# Patient Record
Sex: FEMALE | Race: WHITE | NOT HISPANIC OR LATINO | URBAN - NONMETROPOLITAN AREA
[De-identification: names, ages, dates, MRNs, and addresses within clinical notes are randomized per-mention and may not be internally consistent; named-entity substitution may affect disease eponyms.]

---

## 2021-05-07 ENCOUNTER — LAB OUTSIDE AN ENCOUNTER (OUTPATIENT)
Dept: URBAN - NONMETROPOLITAN AREA CLINIC 13 | Facility: CLINIC | Age: 66
End: 2021-05-07

## 2021-05-07 ENCOUNTER — OFFICE VISIT (OUTPATIENT)
Dept: URBAN - NONMETROPOLITAN AREA CLINIC 13 | Facility: CLINIC | Age: 66
End: 2021-05-07
Payer: MEDICARE

## 2021-05-07 VITALS
BODY MASS INDEX: 39.82 KG/M2 | HEART RATE: 109 BPM | WEIGHT: 239 LBS | SYSTOLIC BLOOD PRESSURE: 172 MMHG | TEMPERATURE: 97 F | DIASTOLIC BLOOD PRESSURE: 71 MMHG | HEIGHT: 65 IN

## 2021-05-07 DIAGNOSIS — K90.0 CELIAC DISEASE: ICD-10-CM

## 2021-05-07 DIAGNOSIS — Z86.010 PERSONAL HISTORY OF COLONIC POLYPS: ICD-10-CM

## 2021-05-07 PROCEDURE — 99244 OFF/OP CNSLTJ NEW/EST MOD 40: CPT | Performed by: INTERNAL MEDICINE

## 2021-05-07 NOTE — HPI-TODAY'S VISIT:
5/7/2021 The patient is a 66-year-old female who is referred by Dr. Priti Browne for evaluation and treatment of celiac disease.  A copy of this consultation will be sent to the referring physician.  The patient is a 66-year-old female with a history of diabetes.  She was diagnosed with celiac on routine blood work by her endocrinologist.  She was seeing a gastroenterologist in [place], but her insurance will not cover this anymore, so she has switched to us.  Her most recent EGD and colonoscopy were done in December 2020.  We do not have these pathology results today.  Her original celiac labs showed a very high TTG IgA titer in the 140 range.  She is unsure if she has had her labs rechecked since that time.  She has not been very compliant with her gluten-free diet.  She does continue to eat bread.  She states that she really does not have any symptoms from her celiac disease, and she is restricted in her diet because of her diabetes.  We have had a long discussion today about the risks of not being gluten-free if she does have celiac disease, and she does want to work on this.  At some point, we will likely refer her to our dietitian.  She also has had a recent colonoscopy.  It sounds as if she had a large polyp, and she had a repeat colonoscopy to make sure that the polyp was removed.  Again, we do not have these results today.  From a GI standpoint, today, she is actually feeling quite well.  She is to proceed with a repeat EGD.  She was told by her other gastroenterologist she needed another endoscopy.  She is also willing to have her labs rechecked today.  We have had a long discussion about celiac disease and its long-term complications.

## 2021-05-09 LAB
A/G RATIO: 2
ALBUMIN: 4.5
ALKALINE PHOSPHATASE: 147
ALT (SGPT): 29
AST (SGOT): 29
BASO (ABSOLUTE): 0.1
BASOS: 1
BILIRUBIN, TOTAL: 0.5
BUN/CREATININE RATIO: 21
BUN: 21
CALCIUM: 9.9
CARBON DIOXIDE, TOTAL: 26
CHLORIDE: 99
CREATININE: 0.98
EGFR IF AFRICN AM: 71
EGFR IF NONAFRICN AM: 61
ENDOMYSIAL ANTIBODY IGA: POSITIVE
EOS (ABSOLUTE): 0.3
EOS: 4
GLOBULIN, TOTAL: 2.3
GLUCOSE: 155
HEMATOCRIT: 42.1
HEMATOLOGY COMMENTS:: (no result)
HEMOGLOBIN: 13.6
IMMATURE CELLS: (no result)
IMMATURE GRANS (ABS): 0
IMMATURE GRANULOCYTES: 0
IMMUNOGLOBULIN A, QN, SERUM: 168
LYMPHS (ABSOLUTE): 2.1
LYMPHS: 23
MCH: 28.9
MCHC: 32.3
MCV: 89
MONOCYTES(ABSOLUTE): 0.6
MONOCYTES: 7
NEUTROPHILS (ABSOLUTE): 6
NEUTROPHILS: 65
NRBC: (no result)
PLATELETS: 246
POTASSIUM: 3.8
PROTEIN, TOTAL: 6.8
RBC: 4.71
RDW: 13.1
SODIUM: 141
T-TRANSGLUTAMINASE (TTG) IGA: 41
WBC: 9.1

## 2021-06-21 ENCOUNTER — OFFICE VISIT (OUTPATIENT)
Dept: URBAN - NONMETROPOLITAN AREA SURGERY CENTER 1 | Facility: SURGERY CENTER | Age: 66
End: 2021-06-21
Payer: MEDICARE

## 2021-06-21 DIAGNOSIS — R10.13 ABDOMINAL DISCOMFORT, EPIGASTRIC: ICD-10-CM

## 2021-06-21 DIAGNOSIS — K29.60 ADENOPAPILLOMATOSIS GASTRICA: ICD-10-CM

## 2021-06-21 DIAGNOSIS — K22.8 COLUMNAR-LINED ESOPHAGUS: ICD-10-CM

## 2021-06-21 PROCEDURE — G8907 PT DOC NO EVENTS ON DISCHARG: HCPCS | Performed by: INTERNAL MEDICINE

## 2021-06-21 PROCEDURE — 43239 EGD BIOPSY SINGLE/MULTIPLE: CPT | Performed by: INTERNAL MEDICINE

## 2021-08-11 ENCOUNTER — WEB ENCOUNTER (OUTPATIENT)
Dept: URBAN - NONMETROPOLITAN AREA CLINIC 13 | Facility: CLINIC | Age: 66
End: 2021-08-11

## 2021-08-28 ENCOUNTER — TELEPHONE ENCOUNTER (OUTPATIENT)
Dept: URBAN - METROPOLITAN AREA CLINIC 13 | Facility: CLINIC | Age: 66
End: 2021-08-28

## 2021-08-29 ENCOUNTER — TELEPHONE ENCOUNTER (OUTPATIENT)
Dept: URBAN - METROPOLITAN AREA CLINIC 13 | Facility: CLINIC | Age: 66
End: 2021-08-29

## 2021-10-04 ENCOUNTER — TELEPHONE ENCOUNTER (OUTPATIENT)
Dept: URBAN - NONMETROPOLITAN AREA CLINIC 13 | Facility: CLINIC | Age: 66
End: 2021-10-04

## 2021-10-04 RX ORDER — OMEPRAZOLE 20 MG/1
1 CAPSULE 30 MINUTES BEFORE MORNING MEAL CAPSULE, DELAYED RELEASE ORAL ONCE A DAY
Qty: 90 CAPSULES | Refills: 3

## 2021-11-12 ENCOUNTER — WEB ENCOUNTER (OUTPATIENT)
Dept: URBAN - NONMETROPOLITAN AREA CLINIC 13 | Facility: CLINIC | Age: 66
End: 2021-11-12

## 2021-11-12 ENCOUNTER — OFFICE VISIT (OUTPATIENT)
Dept: URBAN - NONMETROPOLITAN AREA CLINIC 13 | Facility: CLINIC | Age: 66
End: 2021-11-12
Payer: MEDICARE

## 2021-11-12 VITALS
HEART RATE: 98 BPM | DIASTOLIC BLOOD PRESSURE: 74 MMHG | BODY MASS INDEX: 38.65 KG/M2 | HEIGHT: 65 IN | SYSTOLIC BLOOD PRESSURE: 154 MMHG | WEIGHT: 232 LBS

## 2021-11-12 DIAGNOSIS — K21.00 GASTROESOPHAGEAL REFLUX DISEASE WITH ESOPHAGITIS WITHOUT HEMORRHAGE: ICD-10-CM

## 2021-11-12 DIAGNOSIS — K90.0 CELIAC DISEASE: ICD-10-CM

## 2021-11-12 DIAGNOSIS — Z86.010 PERSONAL HISTORY OF COLONIC POLYPS: ICD-10-CM

## 2021-11-12 PROCEDURE — 99214 OFFICE O/P EST MOD 30 MIN: CPT | Performed by: INTERNAL MEDICINE

## 2021-11-12 RX ORDER — OMEPRAZOLE 20 MG/1
1 CAPSULE 30 MINUTES BEFORE MORNING MEAL CAPSULE, DELAYED RELEASE ORAL ONCE A DAY
Qty: 90 CAPSULES | Refills: 3 | Status: ACTIVE | COMMUNITY

## 2021-11-12 NOTE — HPI-TODAY'S VISIT:
5/7/2021 The patient is a 66-year-old female who is referred by Dr. Priti Browne for evaluation and treatment of celiac disease.  A copy of this consultation will be sent to the referring physician.  The patient is a 66-year-old female with a history of diabetes.  She was diagnosed with celiac on routine blood work by her endocrinologist.  She was seeing a gastroenterologist in [place], but her insurance will not cover this anymore, so she has switched to us.  Her most recent EGD and colonoscopy were done in December 2020.  We do not have these pathology results today.  Her original celiac labs showed a very high TTG IgA titer in the 140 range.  She is unsure if she has had her labs rechecked since that time.  She has not been very compliant with her gluten-free diet.  She does continue to eat bread.  She states that she really does not have any symptoms from her celiac disease, and she is restricted in her diet because of her diabetes.  We have had a long discussion today about the risks of not being gluten-free if she does have celiac disease, and she does want to work on this.  At some point, we will likely refer her to our dietitian.  She also has had a recent colonoscopy.  It sounds as if she had a large polyp, and she had a repeat colonoscopy to make sure that the polyp was removed.  Again, we do not have these results today.  From a GI standpoint, today, she is actually feeling quite well.  She is to proceed with a repeat EGD.  She was told by her other gastroenterologist she needed another endoscopy.  She is also willing to have her labs rechecked today.  We have had a long discussion about celiac disease and its long-term complications. 11/12/2021 The patient presents today for follow-up of her celiac disease and history of colon polyps. Since her last visit, she continues to try to cut back on gluten. She admits that she is not completely gluten-free. Her TTG on her last visit was 41. Interestingly, her biopsies on her EGD did not show evidence of intraepithelial lymphocytes. We have reviewed her colonoscopy done by her previous colon gastroenterologist. She is due for repeat colonoscopy in 2023. Overall, she is feeling well today.

## 2021-11-15 LAB
A/G RATIO: 1.6
ALBUMIN: 4.1
ALKALINE PHOSPHATASE: 168
ALT (SGPT): 25
AST (SGOT): 26
BASO (ABSOLUTE): 0.1
BASOS: 1
BILIRUBIN, TOTAL: 0.3
BUN/CREATININE RATIO: 19
BUN: 17
CALCIUM: 9.3
CARBON DIOXIDE, TOTAL: 22
CHLORIDE: 101
CREATININE: 0.88
DEAMIDATED GLIADIN ABS, IGA: 68
DEAMIDATED GLIADIN ABS, IGG: 59
EGFR IF AFRICN AM: 80
EGFR IF NONAFRICN AM: 70
ENDOMYSIAL ANTIBODY IGA: POSITIVE
EOS (ABSOLUTE): 0.2
EOS: 3
GLOBULIN, TOTAL: 2.5
GLUCOSE: 103
HEMATOCRIT: 40.7
HEMATOLOGY COMMENTS:: (no result)
HEMOGLOBIN: 13.2
IMMATURE CELLS: (no result)
IMMATURE GRANS (ABS): 0
IMMATURE GRANULOCYTES: 0
IMMUNOGLOBULIN A, QN, SERUM: 177
LYMPHS (ABSOLUTE): 1.9
LYMPHS: 28
Lab: (no result)
MCH: 28.1
MCHC: 32.4
MCV: 87
MONOCYTES(ABSOLUTE): 0.4
MONOCYTES: 7
NEUTROPHILS (ABSOLUTE): 4.2
NEUTROPHILS: 61
NRBC: (no result)
PLATELETS: 247
POTASSIUM: 4.3
PROTEIN, TOTAL: 6.6
RBC: 4.69
RDW: 13
SODIUM: 140
T-TRANSGLUTAMINASE (TTG) IGA: 20
T-TRANSGLUTAMINASE (TTG) IGG: 33
WBC: 6.8

## 2022-05-13 ENCOUNTER — OFFICE VISIT (OUTPATIENT)
Dept: URBAN - NONMETROPOLITAN AREA CLINIC 2 | Facility: CLINIC | Age: 67
End: 2022-05-13
Payer: MEDICARE

## 2022-05-13 ENCOUNTER — OFFICE VISIT (OUTPATIENT)
Dept: URBAN - NONMETROPOLITAN AREA CLINIC 2 | Facility: CLINIC | Age: 67
End: 2022-05-13

## 2022-05-13 VITALS
DIASTOLIC BLOOD PRESSURE: 76 MMHG | SYSTOLIC BLOOD PRESSURE: 132 MMHG | BODY MASS INDEX: 37.45 KG/M2 | HEIGHT: 65 IN | TEMPERATURE: 97.4 F | HEART RATE: 80 BPM | WEIGHT: 224.8 LBS

## 2022-05-13 DIAGNOSIS — Z86.010 PERSONAL HISTORY OF COLONIC POLYPS: ICD-10-CM

## 2022-05-13 DIAGNOSIS — K90.0 CELIAC DISEASE: ICD-10-CM

## 2022-05-13 DIAGNOSIS — K21.00 GASTROESOPHAGEAL REFLUX DISEASE WITH ESOPHAGITIS WITHOUT HEMORRHAGE: ICD-10-CM

## 2022-05-13 PROCEDURE — 99214 OFFICE O/P EST MOD 30 MIN: CPT | Performed by: NURSE PRACTITIONER

## 2022-05-13 RX ORDER — OMEPRAZOLE 20 MG/1
1 CAPSULE 30 MINUTES BEFORE MORNING MEAL CAPSULE, DELAYED RELEASE ORAL ONCE A DAY
Qty: 90 CAPSULES | Refills: 3 | Status: ACTIVE | COMMUNITY

## 2022-05-13 NOTE — HPI-TODAY'S VISIT:
5/7/2021 The patient is a 66-year-old female who is referred by Dr. Priti Browne for evaluation and treatment of celiac disease.  A copy of this consultation will be sent to the referring physician.  The patient is a 66-year-old female with a history of diabetes.  She was diagnosed with celiac on routine blood work by her endocrinologist.  She was seeing a gastroenterologist in [place], but her insurance will not cover this anymore, so she has switched to us.  Her most recent EGD and colonoscopy were done in December 2020.  We do not have these pathology results today.  Her original celiac labs showed a very high TTG IgA titer in the 140 range.  She is unsure if she has had her labs rechecked since that time.  She has not been very compliant with her gluten-free diet.  She does continue to eat bread.  She states that she really does not have any symptoms from her celiac disease, and she is restricted in her diet because of her diabetes.  We have had a long discussion today about the risks of not being gluten-free if she does have celiac disease, and she does want to work on this.  At some point, we will likely refer her to our dietitian.  She also has had a recent colonoscopy.  It sounds as if she had a large polyp, and she had a repeat colonoscopy to make sure that the polyp was removed.  Again, we do not have these results today.  From a GI standpoint, today, she is actually feeling quite well.  She is to proceed with a repeat EGD.  She was told by her other gastroenterologist she needed another endoscopy.  She is also willing to have her labs rechecked today.  We have had a long discussion about celiac disease and its long-term complications. 11/12/2021 The patient presents today for follow-up of her celiac disease and history of colon polyps. Since her last visit, she continues to try to cut back on gluten. She admits that she is not completely gluten-free. Her TTG on her last visit was 41. Interestingly, her biopsies on her EGD did not show evidence of intraepithelial lymphocytes. We have reviewed her colonoscopy done by her previous colon gastroenterologist. She is due for repeat colonoscopy in 2023. Overall, she is feeling well today. 5/13/2022 Gely presents for follow-up of reflux and celiac disease.  She is on omeprazole 20 mg daily with no breakthrough.  She continues to take NSAIDs as needed.  Her celiac titers were improved at her last visit, down from 40-20 on her TTG IgA.  She tries to eat gluten-free but does cheat often.  She is due for repeat labs.  Her bowels are moving regularly.  She will be due for screening colonoscopy next year.  MB

## 2022-05-15 LAB
DEAMIDATED GLIADIN ABS, IGA: 94
DEAMIDATED GLIADIN ABS, IGG: 56
ENDOMYSIAL ANTIBODY IGA: POSITIVE
IMMUNOGLOBULIN A, QN, SERUM: 181
T-TRANSGLUTAMINASE (TTG) IGA: 47
T-TRANSGLUTAMINASE (TTG) IGG: 32

## 2022-05-16 ENCOUNTER — TELEPHONE ENCOUNTER (OUTPATIENT)
Dept: URBAN - METROPOLITAN AREA CLINIC 92 | Facility: CLINIC | Age: 67
End: 2022-05-16

## 2022-05-26 ENCOUNTER — WEB ENCOUNTER (OUTPATIENT)
Dept: URBAN - METROPOLITAN AREA TELEHEALTH 2 | Facility: TELEHEALTH | Age: 67
End: 2022-05-26

## 2022-06-01 ENCOUNTER — OFFICE VISIT (OUTPATIENT)
Dept: URBAN - METROPOLITAN AREA TELEHEALTH 2 | Facility: TELEHEALTH | Age: 67
End: 2022-06-01
Payer: MEDICARE

## 2022-06-01 DIAGNOSIS — K90.0 CELIAC DISEASE: ICD-10-CM

## 2022-06-01 PROCEDURE — 97802 MEDICAL NUTRITION INDIV IN: CPT | Performed by: DIETITIAN, REGISTERED

## 2022-06-01 RX ORDER — OMEPRAZOLE 20 MG/1
1 CAPSULE 30 MINUTES BEFORE MORNING MEAL CAPSULE, DELAYED RELEASE ORAL ONCE A DAY
Qty: 90 CAPSULES | Refills: 3 | Status: ACTIVE | COMMUNITY

## 2022-11-03 ENCOUNTER — TELEPHONE ENCOUNTER (OUTPATIENT)
Dept: URBAN - NONMETROPOLITAN AREA CLINIC 13 | Facility: CLINIC | Age: 67
End: 2022-11-03

## 2022-11-07 LAB
A/G RATIO: 2
ALBUMIN: 4.5
ALKALINE PHOSPHATASE: 133
ALT (SGPT): 24
AST (SGOT): 23
BASO (ABSOLUTE): 0.1
BASOS: 1
BILIRUBIN, TOTAL: 0.3
BUN/CREATININE RATIO: 22
BUN: 21
CALCIUM: 9.9
CARBON DIOXIDE, TOTAL: 25
CHLORIDE: 102
CREATININE: 0.97
DEAMIDATED GLIADIN ABS, IGA: 34
DEAMIDATED GLIADIN ABS, IGG: 33
EGFR: 65
ENDOMYSIAL ANTIBODY IGA: NEGATIVE
EOS (ABSOLUTE): 0.3
EOS: 4
GLOBULIN, TOTAL: 2.2
GLUCOSE: 128
HEMATOCRIT: 40.8
HEMATOLOGY COMMENTS:: (no result)
HEMOGLOBIN: 13.1
IMMATURE CELLS: (no result)
IMMATURE GRANS (ABS): 0
IMMATURE GRANULOCYTES: 0
IMMUNOGLOBULIN A, QN, SERUM: 164
LYMPHS (ABSOLUTE): 3.1
LYMPHS: 41
MCH: 28.6
MCHC: 32.1
MCV: 89
MONOCYTES(ABSOLUTE): 0.5
MONOCYTES: 7
NEUTROPHILS (ABSOLUTE): 3.6
NEUTROPHILS: 47
NRBC: (no result)
PLATELETS: 261
POTASSIUM: 4.3
PROTEIN, TOTAL: 6.7
RBC: 4.58
RDW: 12.7
SODIUM: 140
T-TRANSGLUTAMINASE (TTG) IGA: 8
T-TRANSGLUTAMINASE (TTG) IGG: 11
WBC: 7.6

## 2022-11-08 ENCOUNTER — TELEPHONE ENCOUNTER (OUTPATIENT)
Dept: URBAN - METROPOLITAN AREA CLINIC 92 | Facility: CLINIC | Age: 67
End: 2022-11-08

## 2022-11-08 ENCOUNTER — LAB OUTSIDE AN ENCOUNTER (OUTPATIENT)
Dept: URBAN - METROPOLITAN AREA CLINIC 92 | Facility: CLINIC | Age: 67
End: 2022-11-08

## 2022-11-09 ENCOUNTER — OFFICE VISIT (OUTPATIENT)
Dept: URBAN - NONMETROPOLITAN AREA CLINIC 13 | Facility: CLINIC | Age: 67
End: 2022-11-09
Payer: MEDICARE

## 2022-11-09 VITALS
HEART RATE: 86 BPM | HEIGHT: 65 IN | WEIGHT: 223.6 LBS | DIASTOLIC BLOOD PRESSURE: 77 MMHG | BODY MASS INDEX: 37.25 KG/M2 | SYSTOLIC BLOOD PRESSURE: 150 MMHG | TEMPERATURE: 97.7 F

## 2022-11-09 DIAGNOSIS — K21.00 GASTROESOPHAGEAL REFLUX DISEASE WITH ESOPHAGITIS WITHOUT HEMORRHAGE: ICD-10-CM

## 2022-11-09 DIAGNOSIS — K90.0 CELIAC DISEASE: ICD-10-CM

## 2022-11-09 DIAGNOSIS — Z86.010 PERSONAL HISTORY OF COLONIC POLYPS: ICD-10-CM

## 2022-11-09 DIAGNOSIS — R74.8 ELEVATED ALKALINE PHOSPHATASE LEVEL: ICD-10-CM

## 2022-11-09 PROCEDURE — 99214 OFFICE O/P EST MOD 30 MIN: CPT | Performed by: NURSE PRACTITIONER

## 2022-11-09 RX ORDER — OMEPRAZOLE 20 MG/1
1 CAPSULE 30 MINUTES BEFORE MORNING MEAL CAPSULE, DELAYED RELEASE ORAL ONCE A DAY
Qty: 90 CAPSULES | Refills: 3 | Status: ACTIVE | COMMUNITY

## 2022-11-09 NOTE — HPI-TODAY'S VISIT:
5/7/2021 The patient is a 66-year-old female who is referred by Dr. Priti Browne for evaluation and treatment of celiac disease.  A copy of this consultation will be sent to the referring physician.  The patient is a 66-year-old female with a history of diabetes.  She was diagnosed with celiac on routine blood work by her endocrinologist.  She was seeing a gastroenterologist in [place], but her insurance will not cover this anymore, so she has switched to us.  Her most recent EGD and colonoscopy were done in December 2020.  We do not have these pathology results today.  Her original celiac labs showed a very high TTG IgA titer in the 140 range.  She is unsure if she has had her labs rechecked since that time.  She has not been very compliant with her gluten-free diet.  She does continue to eat bread.  She states that she really does not have any symptoms from her celiac disease, and she is restricted in her diet because of her diabetes.  We have had a long discussion today about the risks of not being gluten-free if she does have celiac disease, and she does want to work on this.  At some point, we will likely refer her to our dietitian.  She also has had a recent colonoscopy.  It sounds as if she had a large polyp, and she had a repeat colonoscopy to make sure that the polyp was removed.  Again, we do not have these results today.  From a GI standpoint, today, she is actually feeling quite well.  She is to proceed with a repeat EGD.  She was told by her other gastroenterologist she needed another endoscopy.  She is also willing to have her labs rechecked today.  We have had a long discussion about celiac disease and its long-term complications. 11/12/2021 The patient presents today for follow-up of her celiac disease and history of colon polyps. Since her last visit, she continues to try to cut back on gluten. She admits that she is not completely gluten-free. Her TTG on her last visit was 41. Interestingly, her biopsies on her EGD did not show evidence of intraepithelial lymphocytes. We have reviewed her colonoscopy done by her previous colon gastroenterologist. She is due for repeat colonoscopy in 2023. Overall, she is feeling well today. 5/13/2022 Gely presents for follow-up of reflux and celiac disease.  She is on omeprazole 20 mg daily with no breakthrough.  She continues to take NSAIDs as needed.  Her celiac titers were improved at her last visit, down from 40-20 on her TTG IgA.  She tries to eat gluten-free but does cheat often.  She is due for repeat labs.  Her bowels are moving regularly.  She will be due for screening colonoscopy next year.  MB 11/9/2022 Gely presents for follow-up of celiac disease.  Since her last visit her TTG IgA is down to 8, she continues to strictly free diet.  Her labs showed a mildly elevated alkaline phosphatase.  Her sister has fatty liver.  Her bowels are moving regularly.  Her reflux is stable on omeprazole 20 mg daily.  Today she has no new GI complaints.  She is due for repeat colonoscopy in December 2023.  She will continue her gluten-free diet, she agrees to repeat labs in 3 months with her alkaline phosphatase along with serologies and ultrasound imaging of her liver today.  MB

## 2022-12-02 ENCOUNTER — LAB OUTSIDE AN ENCOUNTER (OUTPATIENT)
Dept: URBAN - METROPOLITAN AREA CLINIC 92 | Facility: CLINIC | Age: 67
End: 2022-12-02

## 2022-12-02 ENCOUNTER — TELEPHONE ENCOUNTER (OUTPATIENT)
Dept: URBAN - METROPOLITAN AREA CLINIC 92 | Facility: CLINIC | Age: 67
End: 2022-12-02

## 2022-12-26 ENCOUNTER — TELEPHONE ENCOUNTER (OUTPATIENT)
Dept: URBAN - METROPOLITAN AREA CLINIC 92 | Facility: CLINIC | Age: 67
End: 2022-12-26

## 2023-05-10 ENCOUNTER — OFFICE VISIT (OUTPATIENT)
Dept: URBAN - NONMETROPOLITAN AREA CLINIC 13 | Facility: CLINIC | Age: 68
End: 2023-05-10

## 2023-06-29 ENCOUNTER — CLAIMS CREATED FROM THE CLAIM WINDOW (OUTPATIENT)
Dept: URBAN - NONMETROPOLITAN AREA CLINIC 2 | Facility: CLINIC | Age: 68
End: 2023-06-29
Payer: MEDICARE

## 2023-06-29 ENCOUNTER — WEB ENCOUNTER (OUTPATIENT)
Dept: URBAN - NONMETROPOLITAN AREA CLINIC 2 | Facility: CLINIC | Age: 68
End: 2023-06-29

## 2023-06-29 ENCOUNTER — LAB OUTSIDE AN ENCOUNTER (OUTPATIENT)
Dept: URBAN - NONMETROPOLITAN AREA CLINIC 2 | Facility: CLINIC | Age: 68
End: 2023-06-29

## 2023-06-29 VITALS
BODY MASS INDEX: 36.82 KG/M2 | DIASTOLIC BLOOD PRESSURE: 69 MMHG | WEIGHT: 221 LBS | TEMPERATURE: 98.6 F | SYSTOLIC BLOOD PRESSURE: 148 MMHG | HEIGHT: 65 IN | HEART RATE: 76 BPM

## 2023-06-29 DIAGNOSIS — K76.89 LESION OF LIVER: ICD-10-CM

## 2023-06-29 DIAGNOSIS — K76.9 LIVER LESION: ICD-10-CM

## 2023-06-29 DIAGNOSIS — K21.00 GASTROESOPHAGEAL REFLUX DISEASE WITH ESOPHAGITIS WITHOUT HEMORRHAGE: ICD-10-CM

## 2023-06-29 DIAGNOSIS — Z86.010 PERSONAL HISTORY OF COLONIC POLYPS: ICD-10-CM

## 2023-06-29 DIAGNOSIS — R74.8 ELEVATED ALKALINE PHOSPHATASE LEVEL: ICD-10-CM

## 2023-06-29 DIAGNOSIS — K90.0 CELIAC DISEASE: ICD-10-CM

## 2023-06-29 PROBLEM — 274770006: Status: ACTIVE | Noted: 2022-11-08

## 2023-06-29 PROCEDURE — 99214 OFFICE O/P EST MOD 30 MIN: CPT | Performed by: NURSE PRACTITIONER

## 2023-06-29 RX ORDER — OMEPRAZOLE 20 MG/1
1 CAPSULE 30 MINUTES BEFORE MORNING MEAL CAPSULE, DELAYED RELEASE ORAL ONCE A DAY
Qty: 90 CAPSULES | Refills: 3 | Status: ACTIVE | COMMUNITY

## 2023-06-29 RX ORDER — SODIUM PICOSULFATE, MAGNESIUM OXIDE, AND ANHYDROUS CITRIC ACID 12; 3.5; 1 G/175ML; G/175ML; MG/175ML
175 ML THE FIRST DOSE THE EVENING BEFORE AND SECOND DOSE THE MORNING OF COLONOSCOPY LIQUID ORAL ONCE A DAY
Qty: 350 | OUTPATIENT
Start: 2023-06-29 | End: 2023-07-01

## 2023-06-29 NOTE — HPI-TODAY'S VISIT:
5/7/2021 The patient is a 66-year-old female who is referred by Dr. Priti Browne for evaluation and treatment of celiac disease.  A copy of this consultation will be sent to the referring physician.  The patient is a 66-year-old female with a history of diabetes.  She was diagnosed with celiac on routine blood work by her endocrinologist.  She was seeing a gastroenterologist in [place], but her insurance will not cover this anymore, so she has switched to us.  Her most recent EGD and colonoscopy were done in December 2020.  We do not have these pathology results today.  Her original celiac labs showed a very high TTG IgA titer in the 140 range.  She is unsure if she has had her labs rechecked since that time.  She has not been very compliant with her gluten-free diet.  She does continue to eat bread.  She states that she really does not have any symptoms from her celiac disease, and she is restricted in her diet because of her diabetes.  We have had a long discussion today about the risks of not being gluten-free if she does have celiac disease, and she does want to work on this.  At some point, we will likely refer her to our dietitian.  She also has had a recent colonoscopy.  It sounds as if she had a large polyp, and she had a repeat colonoscopy to make sure that the polyp was removed.  Again, we do not have these results today.  From a GI standpoint, today, she is actually feeling quite well.  She is to proceed with a repeat EGD.  She was told by her other gastroenterologist she needed another endoscopy.  She is also willing to have her labs rechecked today.  We have had a long discussion about celiac disease and its long-term complications. 11/12/2021 The patient presents today for follow-up of her celiac disease and history of colon polyps. Since her last visit, she continues to try to cut back on gluten. She admits that she is not completely gluten-free. Her TTG on her last visit was 41. Interestingly, her biopsies on her EGD did not show evidence of intraepithelial lymphocytes. We have reviewed her colonoscopy done by her previous colon gastroenterologist. She is due for repeat colonoscopy in 2023. Overall, she is feeling well today. 5/13/2022 Gely presents for follow-up of reflux and celiac disease.  She is on omeprazole 20 mg daily with no breakthrough.  She continues to take NSAIDs as needed.  Her celiac titers were improved at her last visit, down from 40-20 on her TTG IgA.  She tries to eat gluten-free but does cheat often.  She is due for repeat labs.  Her bowels are moving regularly.  She will be due for screening colonoscopy next year.  MB 11/9/2022 Gely presents for follow-up of celiac disease.  Since her last visit her TTG IgA is down to 8, she continues to strictly free diet.  Her labs showed a mildly elevated alkaline phosphatase.  Her sister has fatty liver.  Her bowels are moving regularly.  Her reflux is stable on omeprazole 20 mg daily.  Today she has no new GI complaints.  She is due for repeat colonoscopy in December 2023.  She will continue her gluten-free diet, she agrees to repeat labs in 3 months with her alkaline phosphatase along with serologies and ultrasound imaging of her liver today.  MB 6/29/2023 Gely presents for follow-up of celiac disease.  Since her last visit she has not been eating gluten-free, she had back surgery and is now living with her sister.  She got her TTG IgA down from 41-8 on her last labs last fall.  She has been taking NSAIDs for her back but has decreased these and now on gabapentin postop.  Her alkaline phosphatase was elevated but had decreased at her labs last fall.  This is likely combination of medications and untreated celiac disease.  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  Her colonoscopy is due this December and we will schedule today.  We have discussed again the importance of her gluten-free diet.  MB

## 2023-07-07 ENCOUNTER — TELEPHONE ENCOUNTER (OUTPATIENT)
Dept: URBAN - NONMETROPOLITAN AREA CLINIC 2 | Facility: CLINIC | Age: 68
End: 2023-07-07

## 2023-07-07 LAB
A/G RATIO: 1.5
ABSOLUTE BASOPHILS: 62
ABSOLUTE EOSINOPHILS: 131
ABSOLUTE LYMPHOCYTES: 2022
ABSOLUTE MONOCYTES: 359
ABSOLUTE NEUTROPHILS: 4326
ACTIN (SMOOTH MUSCLE) ANTIBODY (IGG): <20
ALBUMIN: 4.1
ALKALINE PHOSPHATASE: 138
ALKALINE PHOSPHATASE: 156
ALPHA-1-ANTITRYPSIN QN: 140
ALT (SGPT): 25
ANA SCREEN, IFA: NEGATIVE
AST (SGOT): 24
BASOPHILS: 0.9
BILIRUBIN, TOTAL: 0.4
BONE ISOENZYMES: 32
BUN/CREATININE RATIO: (no result)
BUN: 17
CALCIUM: 9.9
CARBON DIOXIDE, TOTAL: 30
CHLORIDE: 103
CREATININE: 0.99
EGFR: 63
ENDOMYSIAL ANTIBODY SCR: POSITIVE
ENDOMYSIAL ANTIBODY TITER: (no result)
EOSINOPHILS: 1.9
FERRITIN, SERUM: 24
GGT: 59
GLOBULIN, TOTAL: 2.7
GLUCOSE: 42
HEMATOCRIT: 39
HEMOGLOBIN: 12.4
IMMUNOGLOBULIN A: 195
INTERPRETATION: (no result)
INTESTINAL ISOENZYMES: 4
IRON BIND.CAP.(TIBC): 371
IRON SATURATION: 15
IRON: 55
LIVER ISOENZYMES: 64
LYMPHOCYTES: 29.3
MACROHEPATIC ISOENZYMES: 0
MCH: 28.6
MCHC: 31.8
MCV: 89.9
MITOCHONDRIAL (M2) ANTIBODY: <=20
MONOCYTES: 5.2
MPV: 11.5
NEUTROPHILS: 62.7
PLACENTAL ISOENZYMES: 0
PLATELET COUNT: 310
POTASSIUM: 3.9
PROTEIN, TOTAL: 6.8
RDW: 12.8
RED BLOOD CELL COUNT: 4.34
SODIUM: 141
TISSUE TRANSGLUTAMINASE AB, IGA: 186.9
WHITE BLOOD CELL COUNT: 6.9

## 2023-07-31 ENCOUNTER — TELEPHONE ENCOUNTER (OUTPATIENT)
Dept: URBAN - NONMETROPOLITAN AREA CLINIC 2 | Facility: CLINIC | Age: 68
End: 2023-07-31

## 2023-10-07 ENCOUNTER — LAB OUTSIDE AN ENCOUNTER (OUTPATIENT)
Dept: URBAN - NONMETROPOLITAN AREA CLINIC 2 | Facility: CLINIC | Age: 68
End: 2023-10-07

## 2023-12-05 ENCOUNTER — TELEPHONE ENCOUNTER (OUTPATIENT)
Dept: URBAN - NONMETROPOLITAN AREA CLINIC 2 | Facility: CLINIC | Age: 68
End: 2023-12-05

## 2023-12-15 ENCOUNTER — CLAIMS CREATED FROM THE CLAIM WINDOW (OUTPATIENT)
Dept: URBAN - NONMETROPOLITAN AREA SURGERY CENTER 1 | Facility: SURGERY CENTER | Age: 68
End: 2023-12-15
Payer: MEDICARE

## 2023-12-15 DIAGNOSIS — Z86.010 ADENOMAS PERSONAL HISTORY OF COLONIC POLYPS: ICD-10-CM

## 2023-12-15 DIAGNOSIS — Z09 CARDIOLOGY FOLLOW-UP ENCOUNTER: ICD-10-CM

## 2023-12-15 DIAGNOSIS — Z86.010 PERSONAL HISTORY OF COLON POLYPS: ICD-10-CM

## 2023-12-15 DIAGNOSIS — Z09 ENCOUNTER FOR COLONOSCOPY FOLLOWING COLON POLYP REMOVAL: ICD-10-CM

## 2023-12-15 PROCEDURE — G0105 COLORECTAL SCRN; HI RISK IND: HCPCS | Performed by: CLINIC/CENTER

## 2023-12-15 PROCEDURE — G8907 PT DOC NO EVENTS ON DISCHARG: HCPCS | Performed by: CLINIC/CENTER

## 2023-12-15 PROCEDURE — G0105 COLORECTAL SCRN; HI RISK IND: HCPCS | Performed by: INTERNAL MEDICINE

## 2023-12-15 PROCEDURE — 00812 ANES LWR INTST SCR COLSC: CPT | Performed by: NURSE ANESTHETIST, CERTIFIED REGISTERED

## 2024-01-02 ENCOUNTER — TELEPHONE ENCOUNTER (OUTPATIENT)
Dept: URBAN - NONMETROPOLITAN AREA CLINIC 2 | Facility: CLINIC | Age: 69
End: 2024-01-02

## 2024-01-09 LAB
A/G RATIO: 1.7
ABSOLUTE BASOPHILS: 41
ABSOLUTE EOSINOPHILS: 311
ABSOLUTE LYMPHOCYTES: 2105
ABSOLUTE MONOCYTES: 373
ABSOLUTE NEUTROPHILS: 4071
ALBUMIN: 4.1
ALKALINE PHOSPHATASE: 180
ALT (SGPT): 22
AST (SGOT): 17
BASOPHILS: 0.6
BILIRUBIN, TOTAL: 0.4
BUN/CREATININE RATIO: (no result)
BUN: 23
CALCIUM: 9.5
CARBON DIOXIDE, TOTAL: 32
CHLORIDE: 97
CREATININE: 0.93
EGFR: 68
ENDOMYSIAL ANTIBODY SCR: NEGATIVE
EOSINOPHILS: 4.5
GGT: 70
GLOBULIN, TOTAL: 2.4
GLUCOSE: 245
HEMATOCRIT: 39.4
HEMOGLOBIN: 12.8
IMMUNOGLOBULIN A: 245
INTERPRETATION: (no result)
LYMPHOCYTES: 30.5
MCH: 28.1
MCHC: 32.5
MCV: 86.4
MONOCYTES: 5.4
MPV: 11.5
NEUTROPHILS: 59
PLATELET COUNT: 325
POTASSIUM: 4
PROTEIN, TOTAL: 6.5
RDW: 12.9
RED BLOOD CELL COUNT: 4.56
SODIUM: 139
TISSUE TRANSGLUTAMINASE AB, IGA: 77.6
WHITE BLOOD CELL COUNT: 6.9

## 2024-01-10 ENCOUNTER — LAB OUTSIDE AN ENCOUNTER (OUTPATIENT)
Dept: URBAN - NONMETROPOLITAN AREA CLINIC 13 | Facility: CLINIC | Age: 69
End: 2024-01-10

## 2024-01-10 ENCOUNTER — DASHBOARD ENCOUNTERS (OUTPATIENT)
Age: 69
End: 2024-01-10

## 2024-01-10 ENCOUNTER — OFFICE VISIT (OUTPATIENT)
Dept: URBAN - NONMETROPOLITAN AREA CLINIC 13 | Facility: CLINIC | Age: 69
End: 2024-01-10
Payer: MEDICARE

## 2024-01-10 VITALS
SYSTOLIC BLOOD PRESSURE: 147 MMHG | WEIGHT: 222.4 LBS | TEMPERATURE: 98.6 F | DIASTOLIC BLOOD PRESSURE: 74 MMHG | BODY MASS INDEX: 37.05 KG/M2 | HEIGHT: 65 IN | HEART RATE: 61 BPM

## 2024-01-10 DIAGNOSIS — R74.8 ELEVATED ALKALINE PHOSPHATASE LEVEL: ICD-10-CM

## 2024-01-10 DIAGNOSIS — K90.0 CELIAC DISEASE: ICD-10-CM

## 2024-01-10 DIAGNOSIS — Z86.010 PERSONAL HISTORY OF COLONIC POLYPS: ICD-10-CM

## 2024-01-10 DIAGNOSIS — K21.00 GASTROESOPHAGEAL REFLUX DISEASE WITH ESOPHAGITIS WITHOUT HEMORRHAGE: ICD-10-CM

## 2024-01-10 PROBLEM — 300331000: Status: ACTIVE | Noted: 2022-12-02

## 2024-01-10 PROBLEM — 396331005: Status: ACTIVE | Noted: 2021-05-07

## 2024-01-10 PROBLEM — 428283002: Status: ACTIVE | Noted: 2021-05-07

## 2024-01-10 PROBLEM — 266433003: Status: ACTIVE | Noted: 2021-10-10

## 2024-01-10 PROCEDURE — 99214 OFFICE O/P EST MOD 30 MIN: CPT | Performed by: NURSE PRACTITIONER

## 2024-01-10 RX ORDER — POLYETHYLENE GLYCOL 3350, SODIUM SULFATE ANHYDROUS, SODIUM BICARBONATE, SODIUM CHLORIDE, POTASSIUM CHLORIDE 236; 22.74; 6.74; 5.86; 2.97 G/4L; G/4L; G/4L; G/4L; G/4L
3000 MILLILITERS POWDER, FOR SOLUTION ORAL ONCE
Qty: 3000 MILLILITERS | Refills: 0 | OUTPATIENT
Start: 2024-01-10 | End: 2024-01-11

## 2024-01-10 RX ORDER — OMEPRAZOLE 20 MG/1
1 CAPSULE 30 MINUTES BEFORE MORNING MEAL CAPSULE, DELAYED RELEASE ORAL ONCE A DAY
Qty: 90 CAPSULES | Refills: 3 | Status: ACTIVE | COMMUNITY

## 2024-01-10 NOTE — HPI-TODAY'S VISIT:
5/7/2021 The patient is a 66-year-old female who is referred by Dr. Priti Browne for evaluation and treatment of celiac disease.  A copy of this consultation will be sent to the referring physician.  The patient is a 66-year-old female with a history of diabetes.  She was diagnosed with celiac on routine blood work by her endocrinologist.  She was seeing a gastroenterologist in [place], but her insurance will not cover this anymore, so she has switched to us.  Her most recent EGD and colonoscopy were done in December 2020.  We do not have these pathology results today.  Her original celiac labs showed a very high TTG IgA titer in the 140 range.  She is unsure if she has had her labs rechecked since that time.  She has not been very compliant with her gluten-free diet.  She does continue to eat bread.  She states that she really does not have any symptoms from her celiac disease, and she is restricted in her diet because of her diabetes.  We have had a long discussion today about the risks of not being gluten-free if she does have celiac disease, and she does want to work on this.  At some point, we will likely refer her to our dietitian.  She also has had a recent colonoscopy.  It sounds as if she had a large polyp, and she had a repeat colonoscopy to make sure that the polyp was removed.  Again, we do not have these results today.  From a GI standpoint, today, she is actually feeling quite well.  She is to proceed with a repeat EGD.  She was told by her other gastroenterologist she needed another endoscopy.  She is also willing to have her labs rechecked today.  We have had a long discussion about celiac disease and its long-term complications. 11/12/2021 The patient presents today for follow-up of her celiac disease and history of colon polyps. Since her last visit, she continues to try to cut back on gluten. She admits that she is not completely gluten-free. Her TTG on her last visit was 41. Interestingly, her biopsies on her EGD did not show evidence of intraepithelial lymphocytes. We have reviewed her colonoscopy done by her previous colon gastroenterologist. She is due for repeat colonoscopy in 2023. Overall, she is feeling well today. 5/13/2022 Gely presents for follow-up of reflux and celiac disease.  She is on omeprazole 20 mg daily with no breakthrough.  She continues to take NSAIDs as needed.  Her celiac titers were improved at her last visit, down from 40-20 on her TTG IgA.  She tries to eat gluten-free but does cheat often.  She is due for repeat labs.  Her bowels are moving regularly.  She will be due for screening colonoscopy next year.  MB 11/9/2022 Gely presents for follow-up of celiac disease.  Since her last visit her TTG IgA is down to 8, she continues to strictly free diet.  Her labs showed a mildly elevated alkaline phosphatase.  Her sister has fatty liver.  Her bowels are moving regularly.  Her reflux is stable on omeprazole 20 mg daily.  Today she has no new GI complaints.  She is due for repeat colonoscopy in December 2023.  She will continue her gluten-free diet, she agrees to repeat labs in 3 months with her alkaline phosphatase along with serologies and ultrasound imaging of her liver today.  MB 6/29/2023 Gely presents for follow-up of celiac disease.  Since her last visit she has not been eating gluten-free, she had back surgery and is now living with her sister.  She got her TTG IgA down from 41-8 on her last labs last fall.  She has been taking NSAIDs for her back but has decreased these and now on gabapentin postop.  Her alkaline phosphatase was elevated but had decreased at her labs last fall.  This is likely combination of medications and untreated celiac disease.  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  Her colonoscopy is due this December and we will schedule today.  We have discussed again the importance of her gluten-free diet.  MB 1/10/2024 Peyton presents for follow-up of celiac disease.  Since her last visit her colonoscopy reveals poor prep.  She will be due for repeat in 6 months.  She is due for repeat imaging of her gallbladder polyp.  Her TTG IgA is back up to 30 and her alkaline phosphatase is up to 180.  I suspect that this is due to her celiac disease.  She is not gluten-free but she is trying.  Today she is doing fairly well with no new GI complaints.  MB

## 2024-06-17 ENCOUNTER — CLAIMS CREATED FROM THE CLAIM WINDOW (OUTPATIENT)
Dept: URBAN - METROPOLITAN AREA CLINIC 4 | Facility: CLINIC | Age: 69
End: 2024-06-17
Payer: MEDICARE

## 2024-06-17 ENCOUNTER — CLAIMS CREATED FROM THE CLAIM WINDOW (OUTPATIENT)
Dept: URBAN - NONMETROPOLITAN AREA SURGERY CENTER 1 | Facility: SURGERY CENTER | Age: 69
End: 2024-06-17
Payer: MEDICARE

## 2024-06-17 DIAGNOSIS — Z09 ENCOUNTER FOR COLONOSCOPY FOLLOWING COLON POLYP REMOVAL: ICD-10-CM

## 2024-06-17 DIAGNOSIS — Z86.010 ADENOMAS PERSONAL HISTORY OF COLONIC POLYPS: ICD-10-CM

## 2024-06-17 DIAGNOSIS — D12.4 ADENOMA OF DESCENDING COLON: ICD-10-CM

## 2024-06-17 DIAGNOSIS — D12.4 BENIGN NEOPLASM OF DESCENDING COLON: ICD-10-CM

## 2024-06-17 DIAGNOSIS — K57.30 DIVERTICULOSIS OF COLON: ICD-10-CM

## 2024-06-17 DIAGNOSIS — Z86.010 PERSONAL HISTORY OF COLON POLYPS: ICD-10-CM

## 2024-06-17 PROCEDURE — 00811 ANES LWR INTST NDSC NOS: CPT | Performed by: NURSE ANESTHETIST, CERTIFIED REGISTERED

## 2024-06-17 PROCEDURE — 45385 COLONOSCOPY W/LESION REMOVAL: CPT | Performed by: INTERNAL MEDICINE

## 2024-06-17 PROCEDURE — 45385 COLONOSCOPY W/LESION REMOVAL: CPT | Performed by: CLINIC/CENTER

## 2024-06-17 PROCEDURE — 88305 TISSUE EXAM BY PATHOLOGIST: CPT | Performed by: PATHOLOGY

## 2024-06-17 RX ORDER — OMEPRAZOLE 20 MG/1
1 CAPSULE 30 MINUTES BEFORE MORNING MEAL CAPSULE, DELAYED RELEASE ORAL ONCE A DAY
Qty: 90 CAPSULES | Refills: 3 | Status: ACTIVE | COMMUNITY

## 2024-07-05 ENCOUNTER — TELEPHONE ENCOUNTER (OUTPATIENT)
Dept: URBAN - NONMETROPOLITAN AREA CLINIC 13 | Facility: CLINIC | Age: 69
End: 2024-07-05

## 2024-07-10 ENCOUNTER — LAB OUTSIDE AN ENCOUNTER (OUTPATIENT)
Dept: URBAN - NONMETROPOLITAN AREA CLINIC 13 | Facility: CLINIC | Age: 69
End: 2024-07-10

## 2024-07-10 ENCOUNTER — OFFICE VISIT (OUTPATIENT)
Dept: URBAN - NONMETROPOLITAN AREA CLINIC 13 | Facility: CLINIC | Age: 69
End: 2024-07-10
Payer: MEDICARE

## 2024-07-10 VITALS
SYSTOLIC BLOOD PRESSURE: 136 MMHG | WEIGHT: 230.2 LBS | DIASTOLIC BLOOD PRESSURE: 76 MMHG | HEART RATE: 66 BPM | HEIGHT: 65 IN | BODY MASS INDEX: 38.35 KG/M2

## 2024-07-10 DIAGNOSIS — K76.9 LIVER LESION: ICD-10-CM

## 2024-07-10 DIAGNOSIS — K90.0 CELIAC DISEASE: ICD-10-CM

## 2024-07-10 DIAGNOSIS — K21.00 GASTROESOPHAGEAL REFLUX DISEASE WITH ESOPHAGITIS WITHOUT HEMORRHAGE: ICD-10-CM

## 2024-07-10 DIAGNOSIS — R74.8 ELEVATED ALKALINE PHOSPHATASE LEVEL: ICD-10-CM

## 2024-07-10 PROCEDURE — 99214 OFFICE O/P EST MOD 30 MIN: CPT | Performed by: NURSE PRACTITIONER

## 2024-07-10 RX ORDER — OMEPRAZOLE 20 MG/1
1 CAPSULE 30 MINUTES BEFORE MORNING MEAL CAPSULE, DELAYED RELEASE ORAL ONCE A DAY
Qty: 90 CAPSULES | Refills: 3 | Status: ACTIVE | COMMUNITY

## 2024-07-10 NOTE — HPI-TODAY'S VISIT:
5/7/2021 The patient is a 66-year-old female who is referred by Dr. Priti Browne for evaluation and treatment of celiac disease.  A copy of this consultation will be sent to the referring physician.  The patient is a 66-year-old female with a history of diabetes.  She was diagnosed with celiac on routine blood work by her endocrinologist.  She was seeing a gastroenterologist in [place], but her insurance will not cover this anymore, so she has switched to us.  Her most recent EGD and colonoscopy were done in December 2020.  We do not have these pathology results today.  Her original celiac labs showed a very high TTG IgA titer in the 140 range.  She is unsure if she has had her labs rechecked since that time.  She has not been very compliant with her gluten-free diet.  She does continue to eat bread.  She states that she really does not have any symptoms from her celiac disease, and she is restricted in her diet because of her diabetes.  We have had a long discussion today about the risks of not being gluten-free if she does have celiac disease, and she does want to work on this.  At some point, we will likely refer her to our dietitian.  She also has had a recent colonoscopy.  It sounds as if she had a large polyp, and she had a repeat colonoscopy to make sure that the polyp was removed.  Again, we do not have these results today.  From a GI standpoint, today, she is actually feeling quite well.  She is to proceed with a repeat EGD.  She was told by her other gastroenterologist she needed another endoscopy.  She is also willing to have her labs rechecked today.  We have had a long discussion about celiac disease and its long-term complications. 11/12/2021 The patient presents today for follow-up of her celiac disease and history of colon polyps. Since her last visit, she continues to try to cut back on gluten. She admits that she is not completely gluten-free. Her TTG on her last visit was 41. Interestingly, her biopsies on her EGD did not show evidence of intraepithelial lymphocytes. We have reviewed her colonoscopy done by her previous colon gastroenterologist. She is due for repeat colonoscopy in 2023. Overall, she is feeling well today. 5/13/2022 Geyl presents for follow-up of reflux and celiac disease.  She is on omeprazole 20 mg daily with no breakthrough.  She continues to take NSAIDs as needed.  Her celiac titers were improved at her last visit, down from 40-20 on her TTG IgA.  She tries to eat gluten-free but does cheat often.  She is due for repeat labs.  Her bowels are moving regularly.  She will be due for screening colonoscopy next year.  MB 11/9/2022 Gely presents for follow-up of celiac disease.  Since her last visit her TTG IgA is down to 8, she continues to strictly free diet.  Her labs showed a mildly elevated alkaline phosphatase.  Her sister has fatty liver.  Her bowels are moving regularly.  Her reflux is stable on omeprazole 20 mg daily.  Today she has no new GI complaints.  She is due for repeat colonoscopy in December 2023.  She will continue her gluten-free diet, she agrees to repeat labs in 3 months with her alkaline phosphatase along with serologies and ultrasound imaging of her liver today.  MB 6/29/2023 Gely presents for follow-up of celiac disease.  Since her last visit she has not been eating gluten-free, she had back surgery and is now living with her sister.  She got her TTG IgA down from 41-8 on her last labs last fall.  She has been taking NSAIDs for her back but has decreased these and now on gabapentin postop.  Her alkaline phosphatase was elevated but had decreased at her labs last fall.  This is likely combination of medications and untreated celiac disease.  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  Her colonoscopy is due this December and we will schedule today.  We have discussed again the importance of her gluten-free diet.  MB 1/10/2024 Peyton presents for follow-up of celiac disease.  Since her last visit her colonoscopy reveals poor prep.  She will be due for repeat in 6 months.  She is due for repeat imaging of her gallbladder polyp.  Her TTG IgA is back up to 30 and her alkaline phosphatase is up to 180.  I suspect that this is due to her celiac disease.  She is not gluten-free but she is trying.  Today she is doing fairly well with no new GI complaints.  MB 7/10/2024 Gely presents for follow-up of celiac disease and elevated liver enzymes with history of liver lesion and gallbladder polyp.  Since her last visit she continues to try to eliminate gluten.  She has been doing better lately.  Her TTG last summer was 180, in December of last year was down to 70.  Her alkaline phosphatase is elevated likely due to her unmanaged celiac disease and fatty liver.  She does have benign-appearing hepatic cyst along with a 4 mm gallbladder polyp, she is due for repeat ultrasound and labs today.  She has no new specific GI complaints.  Today we have had a long discussion again about gluten-free diet.  MB

## 2024-07-13 LAB
ALBUMIN: 4.2
ALKALINE PHOSPHATASE: 230
ALT (SGPT): 34
AST (SGOT): 27
BASO (ABSOLUTE): 0
BASOS: 1
BILIRUBIN, TOTAL: 0.2
BONE FRACTION:: 29
BUN/CREATININE RATIO: 19
BUN: 23
CALCIUM: 9.6
CARBON DIOXIDE, TOTAL: 27
CHLORIDE: 102
CREATININE: 1.2
DEAMIDATED GLIADIN ABS, IGA: 54
DEAMIDATED GLIADIN ABS, IGG: 114
EGFR: 50
ENDOMYSIAL ANTIBODY IGA: POSITIVE
EOS (ABSOLUTE): 0.4
EOS: 5
GGT: 83
GLOBULIN, TOTAL: 2.4
GLUCOSE: 142
HEMATOCRIT: 38.6
HEMATOLOGY COMMENTS:: (no result)
HEMOGLOBIN: 12.4
IMMATURE CELLS: (no result)
IMMATURE GRANS (ABS): 0
IMMATURE GRANULOCYTES: 0
IMMUNOGLOBULIN A, QN, SERUM: 208
INTESTINAL FRAC.:: 21
LIVER FRACTION:: 50
LYMPHS (ABSOLUTE): 1.9
LYMPHS: 28
MCH: 28.2
MCHC: 32.1
MCV: 88
MONOCYTES(ABSOLUTE): 0.5
MONOCYTES: 7
NEUTROPHILS (ABSOLUTE): 4
NEUTROPHILS: 59
NRBC: (no result)
PLATELETS: 222
POTASSIUM: 4
PROTEIN, TOTAL: 6.6
RBC: 4.39
RDW: 13.2
SODIUM: 142
T-TRANSGLUTAMINASE (TTG) IGA: 14
T-TRANSGLUTAMINASE (TTG) IGG: 19
WBC: 6.8

## 2024-07-15 ENCOUNTER — TELEPHONE ENCOUNTER (OUTPATIENT)
Dept: URBAN - NONMETROPOLITAN AREA CLINIC 2 | Facility: CLINIC | Age: 69
End: 2024-07-15

## 2024-07-23 ENCOUNTER — TELEPHONE ENCOUNTER (OUTPATIENT)
Dept: URBAN - NONMETROPOLITAN AREA CLINIC 2 | Facility: CLINIC | Age: 69
End: 2024-07-23

## 2024-08-05 ENCOUNTER — LAB OUTSIDE AN ENCOUNTER (OUTPATIENT)
Dept: URBAN - NONMETROPOLITAN AREA CLINIC 13 | Facility: CLINIC | Age: 69
End: 2024-08-05

## 2024-12-30 ENCOUNTER — TELEPHONE ENCOUNTER (OUTPATIENT)
Dept: URBAN - NONMETROPOLITAN AREA CLINIC 2 | Facility: CLINIC | Age: 69
End: 2024-12-30

## 2025-01-08 ENCOUNTER — LAB OUTSIDE AN ENCOUNTER (OUTPATIENT)
Dept: URBAN - NONMETROPOLITAN AREA CLINIC 13 | Facility: CLINIC | Age: 70
End: 2025-01-08

## 2025-01-08 ENCOUNTER — OFFICE VISIT (OUTPATIENT)
Dept: URBAN - NONMETROPOLITAN AREA CLINIC 13 | Facility: CLINIC | Age: 70
End: 2025-01-08
Payer: MEDICARE

## 2025-01-08 VITALS
BODY MASS INDEX: 36.82 KG/M2 | SYSTOLIC BLOOD PRESSURE: 164 MMHG | WEIGHT: 221 LBS | DIASTOLIC BLOOD PRESSURE: 79 MMHG | HEART RATE: 103 BPM | HEIGHT: 65 IN

## 2025-01-08 DIAGNOSIS — K90.0 CELIAC DISEASE: ICD-10-CM

## 2025-01-08 DIAGNOSIS — K76.9 LIVER LESION: ICD-10-CM

## 2025-01-08 DIAGNOSIS — R74.8 ELEVATED ALKALINE PHOSPHATASE LEVEL: ICD-10-CM

## 2025-01-08 DIAGNOSIS — Z86.0101 PERSONAL HISTORY OF ADENOMATOUS AND SERRATED COLON POLYPS: ICD-10-CM

## 2025-01-08 DIAGNOSIS — K21.00 GASTROESOPHAGEAL REFLUX DISEASE WITH ESOPHAGITIS WITHOUT HEMORRHAGE: ICD-10-CM

## 2025-01-08 PROBLEM — 428283002: Status: ACTIVE | Noted: 2025-01-08

## 2025-01-08 PROCEDURE — 99214 OFFICE O/P EST MOD 30 MIN: CPT | Performed by: NURSE PRACTITIONER

## 2025-01-08 RX ORDER — OMEPRAZOLE 20 MG/1
1 CAPSULE 30 MINUTES BEFORE MORNING MEAL CAPSULE, DELAYED RELEASE ORAL ONCE A DAY
Qty: 90 CAPSULES | Refills: 3 | Status: ACTIVE | COMMUNITY

## 2025-01-08 NOTE — HPI-TODAY'S VISIT:
5/7/2021 The patient is a 66-year-old female who is referred by Dr. Priti Browne for evaluation and treatment of celiac disease.  A copy of this consultation will be sent to the referring physician.  The patient is a 66-year-old female with a history of diabetes.  She was diagnosed with celiac on routine blood work by her endocrinologist.  She was seeing a gastroenterologist in [place], but her insurance will not cover this anymore, so she has switched to us.  Her most recent EGD and colonoscopy were done in December 2020.  We do not have these pathology results today.  Her original celiac labs showed a very high TTG IgA titer in the 140 range.  She is unsure if she has had her labs rechecked since that time.  She has not been very compliant with her gluten-free diet.  She does continue to eat bread.  She states that she really does not have any symptoms from her celiac disease, and she is restricted in her diet because of her diabetes.  We have had a long discussion today about the risks of not being gluten-free if she does have celiac disease, and she does want to work on this.  At some point, we will likely refer her to our dietitian.  She also has had a recent colonoscopy.  It sounds as if she had a large polyp, and she had a repeat colonoscopy to make sure that the polyp was removed.  Again, we do not have these results today.  From a GI standpoint, today, she is actually feeling quite well.  She is to proceed with a repeat EGD.  She was told by her other gastroenterologist she needed another endoscopy.  She is also willing to have her labs rechecked today.  We have had a long discussion about celiac disease and its long-term complications. 11/12/2021 The patient presents today for follow-up of her celiac disease and history of colon polyps. Since her last visit, she continues to try to cut back on gluten. She admits that she is not completely gluten-free. Her TTG on her last visit was 41. Interestingly, her biopsies on her EGD did not show evidence of intraepithelial lymphocytes. We have reviewed her colonoscopy done by her previous colon gastroenterologist. She is due for repeat colonoscopy in 2023. Overall, she is feeling well today. 5/13/2022 Gely presents for follow-up of reflux and celiac disease.  She is on omeprazole 20 mg daily with no breakthrough.  She continues to take NSAIDs as needed.  Her celiac titers were improved at her last visit, down from 40-20 on her TTG IgA.  She tries to eat gluten-free but does cheat often.  She is due for repeat labs.  Her bowels are moving regularly.  She will be due for screening colonoscopy next year.  MB 11/9/2022 Gely presents for follow-up of celiac disease.  Since her last visit her TTG IgA is down to 8, she continues to strictly free diet.  Her labs showed a mildly elevated alkaline phosphatase.  Her sister has fatty liver.  Her bowels are moving regularly.  Her reflux is stable on omeprazole 20 mg daily.  Today she has no new GI complaints.  She is due for repeat colonoscopy in December 2023.  She will continue her gluten-free diet, she agrees to repeat labs in 3 months with her alkaline phosphatase along with serologies and ultrasound imaging of her liver today.  MB 6/29/2023 Gely presents for follow-up of celiac disease.  Since her last visit she has not been eating gluten-free, she had back surgery and is now living with her sister.  She got her TTG IgA down from 41-8 on her last labs last fall.  She has been taking NSAIDs for her back but has decreased these and now on gabapentin postop.  Her alkaline phosphatase was elevated but had decreased at her labs last fall.  This is likely combination of medications and untreated celiac disease.  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  Her colonoscopy is due this December and we will schedule today.  We have discussed again the importance of her gluten-free diet.  MB 1/10/2024 Peyton presents for follow-up of celiac disease.  Since her last visit her colonoscopy reveals poor prep.  She will be due for repeat in 6 months.  She is due for repeat imaging of her gallbladder polyp.  Her TTG IgA is back up to 30 and her alkaline phosphatase is up to 180.  I suspect that this is due to her celiac disease.  She is not gluten-free but she is trying.  Today she is doing fairly well with no new GI complaints.  MB 7/10/2024 Gely presents for follow-up of celiac disease and elevated liver enzymes with history of liver lesion and gallbladder polyp.  Since her last visit she continues to try to eliminate gluten.  She has been doing better lately.  Her TTG last summer was 180, in December of last year was down to 70.  Her alkaline phosphatase is elevated likely due to her unmanaged celiac disease and fatty liver.  She does have benign-appearing hepatic cyst along with a 4 mm gallbladder polyp, she is due for repeat ultrasound and labs today.  She has no new specific GI complaints.  Today we have had a long discussion again about gluten-free diet.  MB 1/8/2025 Gely presents for follow-up.  Since her last visit her repeat alkaline phosphatase has reduced, her celiac panel shows a TTG IgA of 13.  She is still trying to eliminate gluten.  Her EGD showed normal small bowel biopsies on her last scope in 20 21, we may consider repeat EGD at her follow-up visit.  Today she denies any GI new GI complaints, we will repeat her ultrasound to follow-up on her liver lesions but also check an elastography.  MB

## 2025-02-24 ENCOUNTER — TELEPHONE ENCOUNTER (OUTPATIENT)
Dept: URBAN - NONMETROPOLITAN AREA CLINIC 2 | Facility: CLINIC | Age: 70
End: 2025-02-24

## 2025-07-17 ENCOUNTER — TELEPHONE ENCOUNTER (OUTPATIENT)
Dept: URBAN - NONMETROPOLITAN AREA CLINIC 2 | Facility: CLINIC | Age: 70
End: 2025-07-17

## 2025-07-23 ENCOUNTER — OFFICE VISIT (OUTPATIENT)
Dept: URBAN - NONMETROPOLITAN AREA CLINIC 2 | Facility: CLINIC | Age: 70
End: 2025-07-23
Payer: MEDICARE

## 2025-07-23 DIAGNOSIS — K21.00 GASTROESOPHAGEAL REFLUX DISEASE WITH ESOPHAGITIS WITHOUT HEMORRHAGE: ICD-10-CM

## 2025-07-23 DIAGNOSIS — Z86.0101 PERSONAL HISTORY OF ADENOMATOUS AND SERRATED COLON POLYPS: ICD-10-CM

## 2025-07-23 DIAGNOSIS — K76.9 LIVER LESION: ICD-10-CM

## 2025-07-23 DIAGNOSIS — R74.8 ELEVATED ALKALINE PHOSPHATASE LEVEL: ICD-10-CM

## 2025-07-23 DIAGNOSIS — K90.0 CELIAC DISEASE: ICD-10-CM

## 2025-07-23 LAB
ALBUMIN: 4.4
ALKALINE PHOSPHATASE: 151
ALT (SGPT): 16
AST (SGOT): 22
BASO (ABSOLUTE): 0.1
BASOS: 1
BILIRUBIN, TOTAL: 0.4
BUN/CREATININE RATIO: 21
BUN: 28
CALCIUM: 10.3
CARBON DIOXIDE, TOTAL: 27
CHLORIDE: 101
CREATININE: 1.32
DEAMIDATED GLIADIN ABS, IGA: 42
DEAMIDATED GLIADIN ABS, IGG: 45
EGFR: 44
ENDOMYSIAL ANTIBODY IGA: POSITIVE
EOS (ABSOLUTE): 0.3
EOS: 4
GGT: 22
GLOBULIN, TOTAL: 2.4
GLUCOSE: 167
HEMATOCRIT: 40.7
HEMATOLOGY COMMENTS:: (no result)
HEMOGLOBIN: 13
IMMATURE CELLS: (no result)
IMMATURE GRANS (ABS): 0
IMMATURE GRANULOCYTES: 0
IMMUNOGLOBULIN A, QN, SERUM: 217
LYMPHS (ABSOLUTE): 2.2
LYMPHS: 30
MCH: 28.6
MCHC: 31.9
MCV: 90
MONOCYTES(ABSOLUTE): 0.5
MONOCYTES: 7
NEUTROPHILS (ABSOLUTE): 4.1
NEUTROPHILS: 58
NRBC: (no result)
PLATELETS: 226
POTASSIUM: 4.5
PROTEIN, TOTAL: 6.8
RBC: 4.54
RDW: 13.6
SODIUM: 143
T-TRANSGLUTAMINASE (TTG) IGA: 15
T-TRANSGLUTAMINASE (TTG) IGG: 10
WBC: 7.2

## 2025-07-23 PROCEDURE — 99214 OFFICE O/P EST MOD 30 MIN: CPT | Performed by: NURSE PRACTITIONER

## 2025-07-23 RX ORDER — OMEPRAZOLE 20 MG/1
1 CAPSULE 30 MINUTES BEFORE MORNING MEAL CAPSULE, DELAYED RELEASE ORAL ONCE A DAY
Qty: 90 CAPSULES | Refills: 3 | Status: ACTIVE | COMMUNITY

## 2025-07-23 NOTE — HPI-TODAY'S VISIT:
7/23/2025 Gely Mcgowan, a 70-year-old female, came in for a routine follow-up of her celiac disease and liver health. She described ongoing difficulties with the gluten-free diet, particularly the expense and spoilage of gluten-free bread, but continues to minimize gluten intake. Her TTG IgA has remained stable at 15, and she understands that further small bowel evaluation may be needed if her numbers worsen. She reported her gastroesophageal reflux is well controlled on long-term omeprazole 20 mg daily. Recent imaging showed her liver lesion and gallbladder polyp are stable, with no fibrosis or significant scarring, and her specialist recommended no further follow-up for two years unless changes occur. Her alkaline phosphatase remains elevated, attributed to fatty liver and celiac disease, and she is making efforts to manage her blood sugar and weight, having lost 6 lbs since her last visit and 15 lbs over the past year. She remains motivated to manage her health despite the challenges. MB